# Patient Record
Sex: FEMALE | Race: OTHER | NOT HISPANIC OR LATINO | URBAN - METROPOLITAN AREA
[De-identification: names, ages, dates, MRNs, and addresses within clinical notes are randomized per-mention and may not be internally consistent; named-entity substitution may affect disease eponyms.]

---

## 2019-10-29 ENCOUNTER — EMERGENCY (EMERGENCY)
Facility: HOSPITAL | Age: 19
LOS: 1 days | Discharge: HOME | End: 2019-10-29
Attending: EMERGENCY MEDICINE | Admitting: EMERGENCY MEDICINE
Payer: MEDICAID

## 2019-10-29 VITALS
DIASTOLIC BLOOD PRESSURE: 90 MMHG | SYSTOLIC BLOOD PRESSURE: 121 MMHG | TEMPERATURE: 98 F | WEIGHT: 136.03 LBS | OXYGEN SATURATION: 100 % | RESPIRATION RATE: 18 BRPM | HEART RATE: 80 BPM | HEIGHT: 60 IN

## 2019-10-29 PROCEDURE — 99284 EMERGENCY DEPT VISIT MOD MDM: CPT

## 2019-10-29 NOTE — ED PROVIDER NOTE - CLINICAL SUMMARY MEDICAL DECISION MAKING FREE TEXT BOX
intermittent frontal pounding headache x 3 weeks with associated intermittent nausea, light sensitivity and phonophobia. Neuro exam intact. No fam hx of brain tumor or aneurysm. Symptoms c/w migraine. Discussed treatment with patient who agrees to try reglan, toradol, NS. No imaging indicated at this time - discussed extensively with patient who agrees.

## 2019-10-29 NOTE — ED PROVIDER NOTE - NSFOLLOWUPCLINICS_GEN_ALL_ED_FT
[facility]  Neurology  [address] [place] [address] [address] [place] [address]  Phone: [phone]  Fax:   Follow Up Time:

## 2019-10-29 NOTE — ED ADULT NURSE NOTE - OBJECTIVE STATEMENT
18 yo F w/ no PMHx presents to ED via walk in c/o HA x3 weeks. Pt states she has had "pounding" headache for 3 weeks, saw PMD was prescribed sumatriptan for migraines. Has been taking medication as prescribed w/o relief. Pt reports associated nausea and sensitivity to light and sound. Denies recent falls or injuries. No change in vision. Extremities strong and equal b/l. Pt denies any CP, SOB, N/V, fever, chills, urinary complaints, constipation, diarrhea, dizziness, weakness. Pt A&Ox4, lungs CTA, +central pulses. Abdomen soft, not tender, not distended. Ambulating w/ steady gait, safety and comfort maintained, no acute distress noted at this time.

## 2019-10-29 NOTE — ED PROVIDER NOTE - OBJECTIVE STATEMENT
Patient is a 20 yo F with no chronic medical problems Patient is a 18 yo F with no chronic medical problems here for 3 weeks of frontal pounding headache. She states she went to a primary doctor who told her it was migraines and prescribed sumatriptan which she has taken without relief. She reports intermittent nausea, sensitivity to light and noise. No focal weakness. No weight loss. No vomiting. No fevers. No neck pain. No chest pain or shortness of breath. Denies family history of aneurysms, brain tumor. She reports her maternal grandmother had migraines.

## 2019-10-29 NOTE — ED PROVIDER NOTE - PATIENT PORTAL LINK FT
You can access the FollowMyHealth Patient Portal offered by [facility] by registering at the following website: [email or web address]. By joining [employer]’s FollowMyHealth portal, you will also be able to view your health information using other applications (apps) compatible with our system.

## 2019-10-29 NOTE — ED PROVIDER NOTE - NSFOLLOWUPINSTRUCTIONS_ED_ALL_ED_FT
Please follow up with neurology as we discussed. Return for symptoms such as fever, vomiting, worsening headache, weakness, difficulty walking or new symptoms of concern.

## 2019-10-30 VITALS
HEART RATE: 74 BPM | OXYGEN SATURATION: 100 % | SYSTOLIC BLOOD PRESSURE: 111 MMHG | DIASTOLIC BLOOD PRESSURE: 72 MMHG | TEMPERATURE: 98 F | RESPIRATION RATE: 16 BRPM

## 2019-10-30 PROCEDURE — 96374 THER/PROPH/DIAG INJ IV PUSH: CPT

## 2019-10-30 PROCEDURE — 99284 EMERGENCY DEPT VISIT MOD MDM: CPT | Mod: 25

## 2019-10-30 PROCEDURE — 96375 TX/PRO/DX INJ NEW DRUG ADDON: CPT

## 2019-10-30 RX ORDER — METOCLOPRAMIDE HCL 10 MG
10 TABLET ORAL ONCE
Refills: 0 | Status: COMPLETED | OUTPATIENT
Start: 2019-10-30 | End: 2019-10-30

## 2019-10-30 RX ORDER — KETOROLAC TROMETHAMINE 30 MG/ML
30 SYRINGE (ML) INJECTION ONCE
Refills: 0 | Status: DISCONTINUED | OUTPATIENT
Start: 2019-10-30 | End: 2019-10-30

## 2019-10-30 RX ORDER — SODIUM CHLORIDE 9 MG/ML
1000 INJECTION INTRAMUSCULAR; INTRAVENOUS; SUBCUTANEOUS ONCE
Refills: 0 | Status: COMPLETED | OUTPATIENT
Start: 2019-10-30 | End: 2019-10-30

## 2019-10-30 RX ADMIN — SODIUM CHLORIDE 1000 MILLILITER(S): 9 INJECTION INTRAMUSCULAR; INTRAVENOUS; SUBCUTANEOUS at 00:06

## 2019-10-30 RX ADMIN — Medication 10 MILLIGRAM(S): at 00:05

## 2019-10-30 RX ADMIN — Medication 30 MILLIGRAM(S): at 00:05

## 2019-11-08 DIAGNOSIS — R51 HEADACHE: ICD-10-CM

## 2020-02-25 PROBLEM — Z78.9 OTHER SPECIFIED HEALTH STATUS: Chronic | Status: ACTIVE | Noted: 2019-10-30

## 2020-03-18 ENCOUNTER — APPOINTMENT (OUTPATIENT)
Dept: OBGYN | Facility: CLINIC | Age: 20
End: 2020-03-18

## 2020-05-14 PROBLEM — Z00.00 ENCOUNTER FOR PREVENTIVE HEALTH EXAMINATION: Status: ACTIVE | Noted: 2020-05-14

## 2020-10-20 NOTE — ED PROVIDER NOTE - CPE EDP EYES NORM
If you are a smoker, it is important for your health to stop smoking. Please be aware that second hand smoke is also harmful. normal...

## 2024-11-04 NOTE — ED PROVIDER NOTE - ENMT, MLM
Show Topical Anesthesia Variable?: Yes Include Z78.9 (Other Specified Conditions Influencing Health Status) As An Associated Diagnosis?: No Consent: The patient's consent was obtained including but not limited to risks of crusting, scabbing, blistering, scarring, darker or lighter pigmentary change, recurrence, incomplete removal and infection. Medical Necessity Information: It is in your best interest to select a reason for this procedure from the list below. All of these items fulfill various CMS LCD requirements except the new and changing color options. Number Of Freeze-Thaw Cycles: 1 freeze-thaw cycle Detail Level: Detailed Medical Necessity Clause: This procedure was medically necessary because the lesions that were treated were: Spray Paint Text: The liquid nitrogen was applied to the skin utilizing a spray paint frosting technique. Duration Of Freeze Thaw-Cycle (Seconds): 5-10 Post-Care Instructions: I reviewed with the patient in detail post-care instructions. Patient is to wear sunprotection, and avoid picking at any of the treated lesions. Pt may apply Vaseline to crusted or scabbing areas. Airway patent, Nasal mucosa clear. Mouth with normal mucosa. Throat has no vesicles, no oropharyngeal exudates and uvula is midline.

## 2024-11-28 NOTE — ED ADULT NURSE NOTE - PAIN RATING/NUMBER SCALE (0-10): ACTIVITY
7

## 2025-03-19 NOTE — ED ADULT NURSE NOTE - CHPI ED NUR SYMPTOMS NEG
What Type Of Note Output Would You Prefer (Optional)?: Standard Output
How Severe Are Your Spot(S)?: moderate
Have Your Spot(S) Been Treated In The Past?: has not been treated
Hpi Title: Evaluation of Skin Lesions
no change in level of consciousness/no confusion/no dizziness/no weakness/no blurred vision/no fever/no loss of consciousness/no numbness/no vomiting

## 2025-04-03 ENCOUNTER — EMERGENCY (EMERGENCY)
Facility: HOSPITAL | Age: 25
LOS: 1 days | Discharge: HOME | End: 2025-04-03
Attending: STUDENT IN AN ORGANIZED HEALTH CARE EDUCATION/TRAINING PROGRAM
Payer: MEDICAID

## 2025-04-03 VITALS
TEMPERATURE: 99 F | SYSTOLIC BLOOD PRESSURE: 122 MMHG | HEART RATE: 77 BPM | WEIGHT: 145.06 LBS | RESPIRATION RATE: 18 BRPM | HEIGHT: 61 IN | OXYGEN SATURATION: 97 % | DIASTOLIC BLOOD PRESSURE: 79 MMHG

## 2025-04-03 PROCEDURE — 99285 EMERGENCY DEPT VISIT HI MDM: CPT

## 2025-04-03 NOTE — ED ADULT TRIAGE NOTE - CHIEF COMPLAINT QUOTE
5 weeks pregnant. Vaginal bleeding 30 minutes ago. Bright red clots. Lower back pain. Confirmed pregnancy by OBGYN

## 2025-04-04 VITALS
SYSTOLIC BLOOD PRESSURE: 124 MMHG | RESPIRATION RATE: 18 BRPM | DIASTOLIC BLOOD PRESSURE: 60 MMHG | HEART RATE: 86 BPM | TEMPERATURE: 99 F | OXYGEN SATURATION: 99 %

## 2025-04-04 LAB
ALBUMIN SERPL ELPH-MCNC: 4.3 G/DL — SIGNIFICANT CHANGE UP (ref 3.3–5)
ALP SERPL-CCNC: 48 U/L — SIGNIFICANT CHANGE UP (ref 40–120)
ALT FLD-CCNC: 12 U/L — SIGNIFICANT CHANGE UP (ref 10–45)
ANION GAP SERPL CALC-SCNC: 12 MMOL/L — SIGNIFICANT CHANGE UP (ref 5–17)
APTT BLD: 30.1 SEC — SIGNIFICANT CHANGE UP (ref 24.5–35.6)
AST SERPL-CCNC: 14 U/L — SIGNIFICANT CHANGE UP (ref 10–40)
BASOPHILS # BLD AUTO: 0.04 K/UL — SIGNIFICANT CHANGE UP (ref 0–0.2)
BASOPHILS NFR BLD AUTO: 0.5 % — SIGNIFICANT CHANGE UP (ref 0–2)
BILIRUB SERPL-MCNC: 0.2 MG/DL — SIGNIFICANT CHANGE UP (ref 0.2–1.2)
BLD GP AB SCN SERPL QL: NEGATIVE — SIGNIFICANT CHANGE UP
BUN SERPL-MCNC: 13 MG/DL — SIGNIFICANT CHANGE UP (ref 7–23)
CALCIUM SERPL-MCNC: 9 MG/DL — SIGNIFICANT CHANGE UP (ref 8.4–10.5)
CHLORIDE SERPL-SCNC: 104 MMOL/L — SIGNIFICANT CHANGE UP (ref 96–108)
CO2 SERPL-SCNC: 20 MMOL/L — LOW (ref 22–31)
CREAT SERPL-MCNC: 0.56 MG/DL — SIGNIFICANT CHANGE UP (ref 0.5–1.3)
EGFR: 128 ML/MIN/1.73M2 — SIGNIFICANT CHANGE UP
EGFR: 128 ML/MIN/1.73M2 — SIGNIFICANT CHANGE UP
EOSINOPHIL # BLD AUTO: 0.07 K/UL — SIGNIFICANT CHANGE UP (ref 0–0.5)
EOSINOPHIL NFR BLD AUTO: 0.8 % — SIGNIFICANT CHANGE UP (ref 0–6)
GLUCOSE SERPL-MCNC: 95 MG/DL — SIGNIFICANT CHANGE UP (ref 70–99)
HCG SERPL-ACNC: 2077 MIU/ML — HIGH
HCT VFR BLD CALC: 35.6 % — SIGNIFICANT CHANGE UP (ref 34.5–45)
HGB BLD-MCNC: 12.1 G/DL — SIGNIFICANT CHANGE UP (ref 11.5–15.5)
IMM GRANULOCYTES NFR BLD AUTO: 0.2 % — SIGNIFICANT CHANGE UP (ref 0–0.9)
INR BLD: 0.91 RATIO — SIGNIFICANT CHANGE UP (ref 0.85–1.16)
LYMPHOCYTES # BLD AUTO: 3.3 K/UL — SIGNIFICANT CHANGE UP (ref 1–3.3)
LYMPHOCYTES # BLD AUTO: 38.9 % — SIGNIFICANT CHANGE UP (ref 13–44)
MCHC RBC-ENTMCNC: 31.3 PG — SIGNIFICANT CHANGE UP (ref 27–34)
MCHC RBC-ENTMCNC: 34 G/DL — SIGNIFICANT CHANGE UP (ref 32–36)
MCV RBC AUTO: 92 FL — SIGNIFICANT CHANGE UP (ref 80–100)
MONOCYTES # BLD AUTO: 0.6 K/UL — SIGNIFICANT CHANGE UP (ref 0–0.9)
MONOCYTES NFR BLD AUTO: 7.1 % — SIGNIFICANT CHANGE UP (ref 2–14)
NEUTROPHILS # BLD AUTO: 4.45 K/UL — SIGNIFICANT CHANGE UP (ref 1.8–7.4)
NEUTROPHILS NFR BLD AUTO: 52.5 % — SIGNIFICANT CHANGE UP (ref 43–77)
NRBC BLD AUTO-RTO: 0 /100 WBCS — SIGNIFICANT CHANGE UP (ref 0–0)
PLATELET # BLD AUTO: 231 K/UL — SIGNIFICANT CHANGE UP (ref 150–400)
POTASSIUM SERPL-MCNC: 4.1 MMOL/L — SIGNIFICANT CHANGE UP (ref 3.5–5.3)
POTASSIUM SERPL-SCNC: 4.1 MMOL/L — SIGNIFICANT CHANGE UP (ref 3.5–5.3)
PROT SERPL-MCNC: 6.9 G/DL — SIGNIFICANT CHANGE UP (ref 6–8.3)
PROTHROM AB SERPL-ACNC: 10.5 SEC — SIGNIFICANT CHANGE UP (ref 9.9–13.4)
RBC # BLD: 3.87 M/UL — SIGNIFICANT CHANGE UP (ref 3.8–5.2)
RBC # FLD: 11.3 % — SIGNIFICANT CHANGE UP (ref 10.3–14.5)
RH IG SCN BLD-IMP: POSITIVE — SIGNIFICANT CHANGE UP
SODIUM SERPL-SCNC: 136 MMOL/L — SIGNIFICANT CHANGE UP (ref 135–145)
WBC # BLD: 8.48 K/UL — SIGNIFICANT CHANGE UP (ref 3.8–10.5)
WBC # FLD AUTO: 8.48 K/UL — SIGNIFICANT CHANGE UP (ref 3.8–10.5)

## 2025-04-04 PROCEDURE — 85025 COMPLETE CBC W/AUTO DIFF WBC: CPT

## 2025-04-04 PROCEDURE — 36000 PLACE NEEDLE IN VEIN: CPT

## 2025-04-04 PROCEDURE — 84702 CHORIONIC GONADOTROPIN TEST: CPT

## 2025-04-04 PROCEDURE — 99285 EMERGENCY DEPT VISIT HI MDM: CPT | Mod: 25

## 2025-04-04 PROCEDURE — 85610 PROTHROMBIN TIME: CPT

## 2025-04-04 PROCEDURE — 85730 THROMBOPLASTIN TIME PARTIAL: CPT

## 2025-04-04 PROCEDURE — 86901 BLOOD TYPING SEROLOGIC RH(D): CPT

## 2025-04-04 PROCEDURE — 86900 BLOOD TYPING SEROLOGIC ABO: CPT

## 2025-04-04 PROCEDURE — 93975 VASCULAR STUDY: CPT | Mod: 26

## 2025-04-04 PROCEDURE — 80053 COMPREHEN METABOLIC PANEL: CPT

## 2025-04-04 PROCEDURE — 86850 RBC ANTIBODY SCREEN: CPT

## 2025-04-04 PROCEDURE — 76817 TRANSVAGINAL US OBSTETRIC: CPT | Mod: 26

## 2025-04-04 PROCEDURE — 93975 VASCULAR STUDY: CPT

## 2025-04-04 PROCEDURE — 76817 TRANSVAGINAL US OBSTETRIC: CPT

## 2025-04-04 RX ADMIN — Medication 1000 MILLILITER(S): at 06:10

## 2025-04-04 NOTE — ED PROVIDER NOTE - PATIENT PORTAL LINK FT
You can access the FollowMyHealth Patient Portal offered by [facility] by registering at the following website: [email or web address]. By joining [employer]’s FollowMyHealth portal, you will also be able to view your health information using other applications (apps) compatible with our system.

## 2025-04-04 NOTE — ED PROVIDER NOTE - OBJECTIVE STATEMENT
25-year-old female G1, P0, 5 weeks pregnant, LMP 3/3/2025 presents ED complaining of 1 day of vaginal bleeding.  Symptom onset with low back pain around 630, progressively worsening with passage of bright red blood clots in the vagina around 9 PM.  Prior to arrival in the waiting room had another episodes of bright red clots.  Saw OB on Monday, stated was late with.,  Had ultrasound done which did not show an intrauterine pregnancy, had pregnancy test done, was called back yesterday and was told that it was positive.  Otherwise denies fevers chills nausea vomiting diarrhea chest pain shortness of breath abdominal pain dysuria hematuria. Denies syncopal symptoms.

## 2025-04-04 NOTE — ED PROVIDER NOTE - CLINICAL SUMMARY MEDICAL DECISION MAKING FREE TEXT BOX
25-year-old female G1, P0, 5 weeks pregnant, LMP 3/3/2025 presents ED complaining of 1 day of vaginal bleeding.  Symptom onset with low back pain around 630, progressively worsening with passage of bright red blood clots in the vagina around 9 PM.  Prior to arrival in the waiting room had another episodes of bright red clots.  Saw OB on Monday, stated was late with.,  Had ultrasound done which did not show an intrauterine pregnancy, had pregnancy test done, was called back yesterday and was told that it was positive.  Otherwise denies fevers chills nausea vomiting diarrhea chest pain shortness of breath abdominal pain dysuria hematuria. Denies syncopal symptoms.     VS. Clinically stable. PE, well appearing, no acute distress, AAOx3. NCAT, EOMI, normal conjunctiva, mucous membranes moist, LCTAB no w/r/c, no MRG, RRR, abd mild suprapubic discomfort, no rebound tenderness or guarding, no CVA ttp, no focal neuro deficits, neurovascularly intact, no bruising, rashes, or erythema. Suspicion for spontaneous miscarriage vs incomplete  vs subchorrionic hemorhage vs normal physiologic bleeding of pregnancy. Will check labs, coags, type, US, Nidhi

## 2025-04-04 NOTE — ED PROVIDER NOTE - NSFOLLOWUPINSTRUCTIONS_ED_ALL_ED_FT
We evaluated you for vaginal bleeding today. We checked your hemoglobin and you do not need a blood transfusion at this time. Your situation can change quickly. If you develop bleeding that you are soaking through more than 2 pads per hour for 2 hours, if you develop lightheadedness/dizziness/feeling like you will pass out, chest pain, shortness of breath please return to the emergency room. We recommend that you make an appointment with your obstetrician-gynecologist for repeat evaluation and to ensure the symptoms are improving.     Please followup with your OB in 2 days for a repeat HCG and a repeat US. You can also return to the ED for repeat HCG and US. Please return to the ED for worsening symptoms, this includes worsening abdominal pain, vaginal bleeding, feeling feint or lightheaded, feeling like you are about to pass out.

## 2025-04-04 NOTE — ED PROVIDER NOTE - IV ALTEPLASE EXCL ABS HIDDEN
The following are the instructions for home care, to inform you about your treatment and to help you with comfort and to control the pain and symptoms.    Please proceed to Emergency Room at any time if your medical condition would worsen significantly.    Resting of the affected area is the main principle of treatment.   Continue using left arm sling for as long as physical therapy advises.    Maximum dose of Tylenol (acetaminophen) is 500 mg OTC every 6 hours taken orally for pain control, as needed, for the next few days.    Maximum dose of ibuprofen is 600 mg OTC every 6 hours taken orally as needed for pain, for the next few days, always taken with food and with monitoring for upper abdomen discomfort because ibuprofen may cause upset stomach or stomach ulcers.    Please consider application of over-the-counter OTC Lidocaine-containing antipain ointments like Biofreeze OTC or Icy Hot OTC every few hours on the skin at the pain area. Wash your hands after application of lidocaine.  Cooling with ice packs (while awake) may help.  Warm compresses (while awake) later on may be considered.    Your blood pressure has been elevated.  Please measure blood pressure daily and report your readings to primary care provider if needed.    Please contact primary care provider to reevaluate the recovery process.  I have placed a referral to Physical Therapy.  I have placed a referral to orthopedics hand specialist.    Please call back with any additional questions to make sure that your needs are attended and all of your questions have been answered.    If any tests have been performed and the test results are not available at this time, we will notify you about the test results as soon as they become available.  Please share this information with any of your family members if you prefer.       show

## 2025-04-04 NOTE — ED ADULT NURSE NOTE - NS ED NOTE ABUSE SUSPICION NEGLECT YN
Problem: Pain  Goal: #Acceptable pain level achieved/maintained at rest using NRS/Faces  Description: This goal is used for patients who can self-report.  Acceptable means the level is at or below the identified comfort/function goal.  Outcome: Outcome Not Met, Plan Adjusted   Complains of abdominal pain. PRN fentanyl and norco ordered.     Problem: Depressive Symptoms  Goal: #Depressive s/s (self-reported/observed) are recognized and monitored  Outcome: Outcome Met, Continue evaluating goal progress toward completion   Dc'd from  and admitted to medical for pancreatitis. Chapter lifted per Dr. Villatoro. Belongings returned to patient from security. Patient states slight improvement in mental state. No SI. No need for sitter at this time. Will continue to monitor  
No

## 2025-04-04 NOTE — CONSULT NOTE ADULT - TIME BILLING
/Attendin y/o  at 4w3d by LMP of 3/4/25 presenting with vaginal bleeding w/ hx  positive pregnancy test in her Gyn office.  Pt discussed with me; chart reviewed; I agree with the above Resident's assessment and plan.    Pt presented to the ED w/ complaints of vaginal which started overnight on 4/3 with use of 2 pads and only had to change once since being in the ED.  Pt was seen in her Gyn office on 3/31 for routine exam and noted to have and incidental +pregnancy test.  Pt admits to some cramping and passing come clots, but denies any other complaints.    Vss, afebrile  PE per Resident was benign with some old blood in vault; cervix closed; not uterine or adnexal tenderness.    Labs: H/H: 12.1/35.6; Plt: 231; HC; B+    Sono: IMPRESSION: Neither an intrauterine nor extrauterine gestation is identified. This represents a pregnancy of indeterminate location. Differential diagnosis includes early normal IUP, pregnancy failure or ectopic pregnancy. Serial hCG and ultrasound are recommended to determine the significance of these findings.    A/P  -26 y/o  at 4w3d by LMP of 3/4/25 presenting with vaginal bleeding w/ hx  positive pregnancy test in her Gyn office w/ pregnancy of unknown location.  -Pt is currently stable w/ possible early IUP w/ threatened Ab vs complete ab at this time; low suspicion for ectopic pregnancy, but cannot be ruled out and needs to return for follow-up BHCG in 48hr w/ sono for further evaluation.  -Ectopic precautions given and pt encouraged to return of any severe pain or heavy bleeding.  -No acute Gyn intervention at this time with further management as per ED.    Thank you for the consult; please call if any further questions.    Dr. Caldwell

## 2025-04-04 NOTE — ED PROVIDER NOTE - PROGRESS NOTE DETAILS
Mati Ibrahim, PGY2    OB consulted for vag bleed w preg of unknown location.   pt Ob. Dr. Beadr Mati Ibrahim, PGY2    offered pt pelvic, deffering at this time for exam with OB. Still w some vaginal spotting

## 2025-04-04 NOTE — ED PROVIDER NOTE - ATTENDING CONTRIBUTION TO CARE
25-year-old female , 5 weeks pregnant, LMP 3/3/2025 presents ED complaining of 1 day of vaginal bleeding, passing clots, no measurable pads, no fever, chills, trauma. No confirmed IUP but + preg test.     Hemodynamically stable, NAD, aaox4 to person/place/time/event. no inc wob, rrr, benign abd, no peritoneal signs, no cva ttp, no e/o clinical anemia, no e/o jaundice.     Acute uterine bleeding, atraumatic in setting of + home pregnancy test, no confirmed iup, eval spont ab, ectopic, preg unknown location. Check labs, T&S, bHCG, TVUS. Summary of presentation, physical exam findings, plan, expected turn around time for diagnostics discussed with patient. Agrees.

## 2025-04-04 NOTE — CHART NOTE - NSCHARTNOTEFT_GEN_A_CORE
Patient called to remind to come to ED for repeat bHCG. Patient did not . ASHLEY left    SHWETA Ordoñez PGY4

## 2025-04-04 NOTE — ED ADULT NURSE NOTE - CINV DISCH TEACH PARTICIP
OUTPATIENT PROGRESS NOTE  Date of Service:  8/9/2022  Address: [facility]  [address] [place] [address],First Floor [address]  Dept: [phone]  Loc: [phone]    Subjective:      Patient ID:  [id number]  Petey Breaux is a 30 y.o. female     Gynecologic Exam  The patient's pertinent negatives include no genital itching, genital lesions, genital odor, genital rash, missed menses, pelvic pain, vaginal bleeding or vaginal discharge. The patient is experiencing no pain. She is not pregnant. Pertinent negatives include no abdominal pain, anorexia, back pain, chills, constipation, diarrhea, dysuria, fever, flank pain, frequency, headaches, joint swelling, nausea, painful intercourse, rash, sore throat or urgency. Nothing aggravates the symptoms. She is sexually active. No, her partner does not have an STD. Well exam  She is doing fine  Here for pap   No concerns  No history of abnormal pap   No discharge     Bump on left wrist   getting larger and painful    Review of Systems   Constitutional:  Negative for chills and fever. HENT:  Negative for sore throat. Gastrointestinal:  Negative for abdominal pain, anorexia, constipation, diarrhea and nausea. Genitourinary:  Negative for dysuria, flank pain, frequency, missed menses, pelvic pain, urgency and vaginal discharge. Musculoskeletal:  Negative for back pain. Skin:  Negative for rash. Neurological:  Negative for headaches. Objective:   YOB: 1997    Date of Visit:  8/9/2022       Allergies   Allergen Reactions    Prochlorperazine Anxiety       Outpatient Medications Marked as Taking for the 8/9/22 encounter (Office Visit) with Timothy Morales DO   Medication Sig Dispense Refill    LORazepam (ATIVAN) 0.5 MG tablet Take 1 tablet by mouth 2 times daily as needed for Anxiety for up to 30 days.  60 tablet 0    XULANE 150-35 MCG/24HR APPLY 1 PATCH ONCE A WEEK 3 patch 5    ibuprofen (ADVIL;MOTRIN) 800 MG tablet Take 1 tablet by mouth every 8 hours as needed for Pain 60 tablet 3       Vitals:    08/09/22 0746   BP: 126/70   Weight: 162 lb 12.8 oz (73.8 kg)   Height: 5' 7\" (1.702 m)     Body mass index is 25.5 kg/m². Wt Readings from Last 3 Encounters:   08/09/22 162 lb 12.8 oz (73.8 kg)   01/19/22 148 lb (67.1 kg)   10/14/21 141 lb (64 kg)     BP Readings from Last 3 Encounters:   08/09/22 126/70   01/19/22 122/80   10/14/21 134/80       Physical Exam  Vitals and nursing note reviewed. Constitutional:       Appearance: She is well-developed. HENT:      Head: Normocephalic. Right Ear: External ear normal.      Left Ear: External ear normal.   Neck:      Thyroid: No thyromegaly. Cardiovascular:      Rate and Rhythm: Normal rate and regular rhythm. Heart sounds: Normal heart sounds. Pulmonary:      Effort: Pulmonary effort is normal.      Breath sounds: Normal breath sounds. Abdominal:      General: Bowel sounds are normal.      Palpations: Abdomen is soft. There is no mass. Genitourinary:     Vagina: Vaginal discharge present. Comments: bilat breast with no masses or nipple discharge   Musculoskeletal:         General: Normal range of motion. Lymphadenopathy:      Cervical: No cervical adenopathy. Skin:     General: Skin is warm and dry. Findings: No rash. Neurological:      Mental Status: She is alert and oriented to person, place, and time. Psychiatric:         Behavior: Behavior normal.         Thought Content: Thought content normal.         Judgment: Judgment normal.          Assessment/Plan       Christopher Jean was seen today for gynecologic exam and medication refill. Diagnoses and all orders for this visit:    Well female exam with routine gynecological exam    NEENA (generalized anxiety disorder)  -     LORazepam (ATIVAN) 0.5 MG tablet; Take 1 tablet by mouth 2 times daily as needed for Anxiety for up to 30 days.     Ganglion cyst of wrist, left  - Valerie Jimenez MD, Orthopedic Surgery, [facility]      No follow-ups on file.                     Clemencia Zepeda DO Patient

## 2025-04-04 NOTE — CONSULT NOTE ADULT - SUBJECTIVE AND OBJECTIVE BOX
GABBY BORJA  25y  Female [id number]    HPI:  24 y/o  at 4w3d by LMP of 3/4/25 presenting with vaginal bleeding in the setting of positive pregnancy test. Patient was seen by OB on Monday for annual checkup where she incidentally found to be pregnant. Her bHCG level at that time was 1040. Patient then developed vaginal bleeding that started last night which prompted her to present to ED. She states she went through 2 thin pads at home over a few hours. Since then, patient has changed her pad once in the ED. She is endorsing mild abdominal cramping, but denies any sharp abdominal pain. She states she passed some clots at home but is unsure if she passed any tissue. She denies fevers/chills. She denies nausea/vomiting. She denies lightheadedness or dizziness. This is an unplanned but desired pregnancy.     Name of GYN Physician: Dr. Judith Monterroso  OBHx:  primagravida  GynHx: Denies fibroids, cysts, endometriosis, STI's, Abnormal pap smears   PMH: denies  PSH: denies  Meds: denies  Allx: NKDA  Social History:  Denies smoking use, drug use, alcohol use.       Vital Signs Last 24 Hrs  T(C): 36.9 (2025 05:07), Max: 37.1 (2025 22:13)  T(F): 98.5 (2025 05:07), Max: 98.7 (2025 22:13)  HR: 72 (2025 05:07) (70 - 77)  BP: 97/58 (2025 05:07) (97/58 - 130/81)  BP(mean): 71 (2025 05:07) (71 - 83)  RR: 18 (2025 05:07) (17 - 18)  SpO2: 99% (2025 05:07) (97% - 100%)    Parameters below as of 2025 05:07  Patient On (Oxygen Delivery Method): room air      Physical Exam:   Chaperoned by Jia GROVES  General: sitting comfortably in bed, NAD   HEENT: neck supple, full ROM  CV: well perfused  Lungs: nonlabored respirations  Back: No CVA tenderness  Abd: Soft, non-tender, non-distended.    :  Pad changed ~2 hours ago, 20% saturated. External labia wnl.  Bimanual exam with no cervical motion tenderness, uterus wnl, adnexa non palpable b/l.  Cervix closed.  Speculum Exam: 5cc dark red clot in vault, with no active bleeding from os.        LABS:                        12.1   8.48  )-----------( 231      ( 2025 00:22 )             35.6         136  |  104  |  13  ----------------------------<  95  4.1   |  20[L]  |  0.56    Ca    9.0      2025 00:22    TPro  6.9  /  Alb  4.3  /  TBili  0.2  /  DBili  x   /  AST  14  /  ALT  12  /  AlkPhos  48      I&O's Detail    PT/INR - ( 2025 00:22 )   PT: 10.5 sec;   INR: 0.91 ratio         PTT - ( 2025 00:22 )  PTT:30.1 sec  Urinalysis Basic - ( 2025 00:22 )    Color: x / Appearance: x / SG: x / pH: x  Gluc: 95 mg/dL / Ketone: x  / Bili: x / Urobili: x   Blood: x / Protein: x / Nitrite: x   Leuk Esterase: x / RBC: x / WBC x   Sq Epi: x / Non Sq Epi: x / Bacteria: x    [facility] (4/3):       RADIOLOGY & ADDITIONAL STUDIES:      ACC: [id number] EXAM:  US DPLX PELVIC   ORDERED BY:  MICHELE OSWALD     ACC: [id number] EXAM:  US OB TRANSVAGINAL   ORDERED BY:  MICHELE OSWALD     PROCEDURE DATE:  2025          INTERPRETATION:  CLINICAL INFORMATION: Pregnant with vaginal bleeding.   Beta-hCG     LMP: 2025    Estimated Gestational Age by LMP: 4 weeks 3 days    COMPARISON: None available.    TECHNIQUE: Endovaginal and transabdominal pelvic sonogram. <empty>    FINDINGS:  Uterus: 7.1 x 3.6 x 4.7 cm. Thickened endometrium to 1.3 cm. No IUP.    Gestational Sac Size (mean): N/a  Gestational Sac Shape : N/a  Crown Rump Length: N/a  Estimated Gestational Age: N/a  Yolk Sac: N/a  Fetal Heart Rate: N/a    Right ovary: 3.6 x 3.0 x 3.3 cm. Thick-walled cyst measuring 2.4 x 2.4 x   2.5 cm, likely corpus luteum. Normal arterial and venous waveforms. No   adnexal soft tissue mass. There are 2 simple appearing cysts within the   adnexa, small one measuring 0.5 x 0.7 x 0.6 cm and the slightly larger   and measuring 1.5 x 0.8 x 1.2 cm.  Left ovary: 2.3 x 1.5 x 1.2 cm. Within normal limits. Normal arterial and   venous waveforms.    Fluid: Trace    IMPRESSION:  Neither an intrauterine nor extrauterine gestation is identified. This   represents a pregnancy of indeterminate location. Differential diagnosis   includes early normal IUP, pregnancy failure or ectopic pregnancy. Serial   hCG and ultrasound are recommended to determine the significance of these   findings.        --- End of Report ---            KENYON ROBBINS MD; Attending Radiologist  This document has been electronically signed. 2025  5:03AM

## 2025-04-04 NOTE — ED PROVIDER NOTE - PHYSICAL EXAMINATION
Gen: AAOx3, non-toxic  Head: NCAT  HEENT: EOMI, oral mucosa moist, normal conjunctiva  Lung: CTAB, no respiratory distress, no wheezes/rhonchi/rales B/L,   CV: RRR, no murmurs, rubs or gallops  Abd: soft, mild suprapubic discomfort, no guarding, no CVA tenderness  MSK: no visible deformities  Neuro: No focal sensory or motor deficits  Skin: Warm, well perfused, no rash  Psych: normal affect.

## 2025-04-04 NOTE — ED ADULT NURSE NOTE - OBJECTIVE STATEMENT
24 y/o  F with no significant PMHx presents to the ED at approximately 5 weeks gestation with complaints of vaginal bleeding today. Patient states is followed by OB-GYN however has not had confirmed IUP, scheduled for ultrasound tomorrow. Patient reports today developed vaginal bleeding with clots. Patient has been using 2 pads per hour and developed lower back pain. LMP: 3/4. Denies fever, chills. AOx4 and speaking coherently. Breathing is unlabored, spontaneous, and symmetrical. Abdomen is soft, nondistended, and nontender. No bladder distention. No peripheral edema noted. <2s capillary refill. Ambulatory with full ROM of all extremities.

## 2025-04-04 NOTE — ED PROVIDER NOTE - NSFOLLOWUPCLINICS_GEN_ALL_ED_FT
[facility] Gynecology and Obstetrics  Gynceology/OB  [address] [place] [address]  Phone: [phone]  Fax:

## 2025-04-04 NOTE — CONSULT NOTE ADULT - ASSESSMENT
[facility] Emergency Department    [address] [address]    Phone:  [phone]    Fax:  [phone]                                       Laura Lea   MRN: [id number]    Department:  [facility] Emergency Department   Date of Visit:  12/14/2017           Patient Information     Date Of Birth          1976        Your diagnoses for this visit were:     Flatulence, eructation, and gas pain     Constipation, unspecified constipation type        You were seen by Facundo Thakkar MD.      Follow-up Information     Follow up with No Ref-Primary, Physician.    Why:  As needed        Discharge Instructions         Constipation (Adult)  Constipation means that you have bowel movements that are less frequent than usual. Stools often become very hard and difficult to pass.  Constipation is very common. At some point in life it affects almost everyone. Since everyone's bowel habits are different, what is constipation to one person may not be to another. Your healthcare provider may do tests to diagnose constipation. It depends on what he or she finds when evaluating you.    Symptoms of constipation include:    Abdominal pain    Bloating    Vomiting    Painful bowel movements    Itching, swelling, bleeding, or pain around the anus  Causes  Constipation can have many causes. These include:    Diet low in fiber    Too much dairy    Not drinking enough liquids    Lack of exercise or physical activity. This is especially true for older adults.    Changes in lifestyle or daily routine, including pregnancy, aging, work, and travel    Frequent use or misuse of laxatives    Ignoring the urge to have a bowel movement or delaying it until later    Medicines, such as certain prescription pain medicines, iron supplements, antacids, certain antidepressants, and calcium supplements    Diseases like irritable bowel syndrome, bowel obstructions, stroke, diabetes, thyroid disease, Parkinson disease,  A/P: 26 y/o  at 4w3d by LMP of 3/4/25 presenting with vaginal bleeding in the setting of positive pregnancy test. VSS, physical exam benign with some dark red blood in vault but no active bleeding. H/H of 12.1/35.6 with bHCG trend of 1040 (3/31)->2077(4/3). TVUS with no IUP visualized and no extrauterine gestation visualized with trace free fluid. Differential includes SAB in progress vs threatened AB (given increasing bHCG with bleeding) vs early IUP with low clinical suspicion for ectopic pregnancy at this time.    - Patient to present to ED for repeat bHCG level in 48 hours  - Patient to return to ED sooner if she has heavy vaginal bleeding where she is saturating a pad in <1 hour for 2 hours, if she has severe abdominal pain, any fever of 100.4F or greater, or severe nausea/vomiting.   - Patient's blood type is B+, no Rhogam indicated at this time  - patient to be added to GYN team Beta list   - Remainder of care per ED team    Anai Staley PGY2   d/w Dr. Son hemorrhoids, and colon cancer  Complications  Potential complications of constipation can include:    Hemorrhoids    Rectal bleeding from hemorrhoids or anal fissures (skin tears)    Hernias    Dependency on laxatives    Chronic constipation    Fecal impaction    Bowel obstruction or perforation  Home care  All treatment should be done after talking with your healthcare provider. This is especially true if you have another medical problems, are taking prescription medicines, or are an older adult. Treatment most often involves lifestyle changes. You may also need medicines. Your healthcare provider will tell you which will work best for you. Follow the advice below to help avoid this problem in the future.  Lifestyle changes  These lifestyle changes can help prevent constipation:    Diet. Eat a high-fiber diet, with fresh fruit and vegetables, and reduce dairy intake, meats, and processed foods    Fluids. It's important to get enough fluids each day. Drink plenty of water when you eat more fiber. If you are on diet that limits the amount of fluid you can have, talk about this with your healthcare provider.    Regular exercise. Check with your healthcare provider first.  Medications  Take any medicines as directed. Some laxatives are safe to use only every now and then. Others can be taken on a regular basis. Talk with your doctor or pharmacist if you have questions.  Prescription pain medicines can cause constipation. If you are taking this kind of medicine, ask your healthcare provider if you should also take a stool softener.  Medicines you may take to treat constipation include:    Fiber supplements    Stool softeners    Laxatives    Enemas    Rectal suppositories  Follow-up care  Follow up with your healthcare provider if symptoms don't get better in the next few days. You may need to have more tests or see a specialist.  Call 911  Call 911 if any of these occur:    Trouble breathing    Stiff, rigid abdomen that  is severely painful to touch    Confusion    Fainting or loss of consciousness    Rapid heart rate    Chest pain  When to seek medical advice  Call your healthcare provider right away if any of these occur:    Fever over 100.4 F (38 C)    Failure to resume normal bowel movements    Pain in your abdomen or back gets worse    Nausea or vomiting    Swelling in your abdomen    Blood in the stool    Black, tarry stool    Involuntary weight loss    Weakness  Date Last Reviewed: 12/30/2015 2000-2017 [employer]. [address] [place] [address], [place] [id number]. All rights reserved. This information is not intended as a substitute for professional medical care. Always follow your healthcare professional's instructions.          24 Hour Appointment Hotline       To make an appointment at any [facility], call [phone] ([phone]). If you don't have a family doctor or clinic, we will help you find one. [facility] clinics are conveniently located to serve the needs of you and your family.             Review of your medicines      Our records show that you are taking the medicines listed below. If these are incorrect, please call your family doctor or clinic.        Dose / Directions Last dose taken    B-12 2500 MCG Subl   Dose:  2500 mcg        Place 2,500 mcg under the tongue daily   Refills:  0        BACLOFEN PO   Dose:  10 mg        Take 10 mg by mouth 3 times daily   Refills:  0        DOCUSATE SODIUM PO   Dose:  100 mg        Take 100 mg by mouth 2 times daily   Refills:  0        DULOXETINE HCL PO   Dose:  60 mg        Take 60 mg by mouth daily   Refills:  0        GABAPENTIN PO   Dose:  300 mg        Take 300 mg by mouth 3 times daily   Refills:  0        IBUPROFEN PO   Dose:  400 mg        Take 400 mg by mouth every 6 hours as needed for moderate pain   Refills:  0        magnesium hydroxide 400 MG/5ML suspension   Commonly known as:  MILK OF MAGNESIA   Dose:  30 mL        Take 30 mLs by mouth  daily as needed for constipation or heartburn   Refills:  0        MELATONIN PO   Dose:  5 mg        Take 5 mg by mouth At Bedtime   Refills:  0        multivitamin, therapeutic Tabs tablet   Dose:  1 tablet        Take 1 tablet by mouth daily   Refills:  0        RISPERDAL PO   Dose:  0.25 mg        Take 0.25 mg by mouth 2 times daily   Refills:  0        SIMETHICONE-80 PO   Dose:  160 mg        Take 160 mg by mouth 4 times daily as needed for intestinal gas   Refills:  0        TYLENOL PO   Dose:  500 mg        Take 500 mg by mouth every 6 hours as needed for mild pain or fever   Refills:  0                Procedures and tests performed during your visit     CBC with platelets differential    CT Abdomen Pelvis w Contrast    Comprehensive metabolic panel    Drug abuse screen 77 urine ([place],[facility])    Give 20 ounces of water 15 minutes before CT of abdomen    Lactic acid whole blood    Lipase    Magnesium    Peripheral IV catheter    Phosphorus    UA reflex to Microscopic      Orders Needing Specimen Collection     None      Pending Results     Date and Time Order Name Status Description    12/14/2017 1839 CT Abdomen Pelvis w Contrast Preliminary             Pending Culture Results     No orders found from 12/12/2017 to 12/15/2017.            Pending Results Instructions     If you had any lab results that were not finalized at the time of your Discharge, you can call the ED Lab Result RN at [phone]. You will be contacted by this team for any positive Lab results or changes in treatment. The nurses are available 7 days a week from 10A to 6:30P.  You can leave a message 24 hours per day and they will return your call.        Test Results From Your Hospital Stay        12/14/2017  6:01 PM      Component Results     Component Value Ref Range & Units Status    WBC 8.0 4.0 - 11.0 10e9/L Final    RBC Count 3.63 (L) 3.8 - 5.2 10e12/L Final    Hemoglobin 10.6 (L) 11.7 - 15.7 g/dL Final    Hematocrit 33.1 (L) 35.0 - 47.0 %  Final    MCV 91 78 - 100 fl Final    MCH 29.2 26.5 - 33.0 pg Final    MCHC 32.0 31.5 - 36.5 g/dL Final    RDW 14.8 10.0 - 15.0 % Final    Platelet Count 378 150 - 450 10e9/L Final    Diff Method Automated Method  Final    % Neutrophils 51.6 % Final    % Lymphocytes 35.0 % Final    % Monocytes 9.4 % Final    % Eosinophils 3.3 % Final    % Basophils 0.4 % Final    % Immature Granulocytes 0.3 % Final    Absolute Neutrophil 4.1 1.6 - 8.3 10e9/L Final    Absolute Lymphocytes 2.8 0.8 - 5.3 10e9/L Final    Absolute Monocytes 0.8 0.0 - 1.3 10e9/L Final    Absolute Eosinophils 0.3 0.0 - 0.7 10e9/L Final    Absolute Basophils 0.0 0.0 - 0.2 10e9/L Final    Abs Immature Granulocytes 0.0 0 - 0.4 10e9/L Final         12/14/2017  6:32 PM      Component Results     Component Value Ref Range & Units Status    Sodium 141 133 - 144 mmol/L Final    Potassium 4.1 3.4 - 5.3 mmol/L Final    Chloride 108 94 - 109 mmol/L Final    Carbon Dioxide 24 20 - 32 mmol/L Final    Anion Gap 9 3 - 14 mmol/L Final    Glucose 112 (H) 70 - 99 mg/dL Final    Urea Nitrogen 25 7 - 30 mg/dL Final    Creatinine 0.58 0.52 - 1.04 mg/dL Final    GFR Estimate >90 >60 mL/min/1.7m2 Final    Non  GFR Calc    GFR Estimate If Black >90 >60 mL/min/1.7m2 Final    African American GFR Calc    Calcium 8.4 (L) 8.5 - 10.1 mg/dL Final    Bilirubin Total 0.1 (L) 0.2 - 1.3 mg/dL Final    Albumin 3.4 3.4 - 5.0 g/dL Final    Protein Total 6.8 6.8 - 8.8 g/dL Final    Alkaline Phosphatase 113 40 - 150 U/L Final    ALT 29 0 - 50 U/L Final    AST 28 0 - 45 U/L Final         12/14/2017  6:30 PM      Component Results     Component Value Ref Range & Units Status    Lipase 132 73 - 393 U/L Final         12/14/2017  5:55 PM      Component Results     Component Value Ref Range & Units Status    Lactic Acid 1.0 0.7 - 2.0 mmol/L Final         12/14/2017  7:12 PM      Component Results     Component Value Ref Range & Units Status    Color Urine Yellow  Final    Appearance  Urine Clear  Final    Glucose Urine Negative NEG^Negative mg/dL Final    Bilirubin Urine Negative NEG^Negative Final    Ketones Urine Negative NEG^Negative mg/dL Final    Specific Gravity Urine 1.016 1.003 - 1.035 Final    Blood Urine Negative NEG^Negative Final    pH Urine 5.5 5.0 - 7.0 pH Final    Protein Albumin Urine Negative NEG^Negative mg/dL Final    Urobilinogen mg/dL Normal 0.0 - 2.0 mg/dL Final    Nitrite Urine Negative NEG^Negative Final    Leukocyte Esterase Urine Negative NEG^Negative Final    Source Midstream Urine  Final         12/14/2017  7:27 PM      Component Results     Component Value Ref Range & Units Status    Amphetamine Qual Urine Negative NEG^Negative Final    Cutoff for a negative amphetamine is 500 ng/mL or less.    Barbiturates Qual Urine Negative NEG^Negative Final    Cutoff for a negative barbiturate is 200 ng/mL or less.    Benzodiazepine Qual Urine Negative NEG^Negative Final    Cutoff for a negative benzodiazepine is 200 ng/mL or less.    Cannabinoids Qual Urine Negative NEG^Negative Final    Cutoff for a negative cannabinoid is 50 ng/mL or less.    Cocaine Qual Urine Negative NEG^Negative Final    Cutoff for a negative cocaine is 300 ng/mL or less.    Opiates Qualitative Urine Negative NEG^Negative Final    Cutoff for a negative opiate is 300 ng/mL or less.    PCP Qual Urine Negative NEG^Negative Final    Cutoff for a negative PCP is 25 ng/mL or less.         12/14/2017  6:32 PM      Component Results     Component Value Ref Range & Units Status    Phosphorus 4.1 2.5 - 4.5 mg/dL Final         12/14/2017  6:30 PM      Component Results     Component Value Ref Range & Units Status    Magnesium 2.3 1.6 - 2.3 mg/dL Final         12/14/2017  7:46 PM      Narrative     CT ABDOMEN AND PELVIS WITH CONTRAST 12/14/2017 7:11 PM     HISTORY: Bowel distention/previous history of gastric bypass.    TECHNIQUE: 80 mL Isovue-370. Radiation dose for this scan was reduced  using automated exposure  "control, adjustment of the mA and/or kV  according to patient size, or iterative reconstruction technique.    COMPARISON: None.    FINDINGS: Included portions of the lung bases are unremarkable. The  liver, spleen, and pancreas appear normal. No adrenal lesions. Kidneys  are normal. No retroperitoneal adenopathy or evidence of aortic  aneurysm.    There is a large amount of stool in the colon consistent with  constipation. No evidence of diverticulitis. There is very little  mesenteric fat in this patient making evaluation of the appendix  difficult. The appendix is not identified. No obvious inflammatory  changes in the right lower quadrant.    There appears to be a surgical clip in the pelvis. There are changes  from previous gastric bypass surgery.    There is diffuse edema in the subcutaneous fat.        Impression     IMPRESSION:  1. Previous gastric bypass. There is very little mesenteric fat in the  abdomen making evaluation of the appendix and bowel wall difficult.  There does appear to be constipation with a large amount of stool in  the colon.  2. Diffuse subcutaneous edema. This may be related to hypoalbuminemia  in a malnourished patient. Recommend clinical correlation.  3. Appendix not identified, but no obvious inflammatory changes in the  right lower quadrant.                Thank you for choosing [facility]       Thank you for choosing [facility] for your care. Our goal is always to provide you with excellent care. Hearing back from our patients is one way we can continue to improve our services. Please take a few minutes to complete the written survey that you may receive in the mail after you visit with us. Thank you!        [email or web address] Information     [email or web address] lets you send messages to your doctor, view your test results, renew your prescriptions, schedule appointments and more. To sign up, go to [email or web address] . Click on \"Log in\" on the left side of the screen, which will take you to the Welcome " "page. Then click on \"Sign up Now\" on the right side of the page.     You will be asked to enter the access code listed below, as well as some personal information. Please follow the directions to create your username and password.     Your access code is: [email or web address]  Expires: 3/14/2018  8:21 PM     Your access code will  in 90 days. If you need help or a new code, please call your [facility] clinic or [phone].        Care EveryWhere ID     This is your Care EveryWhere ID. This could be used by other organizations to access your [facility] medical records  [id number]        Equal Access to Services     DAYRON WILCOX : Yenny Perez, padilla ennis, drew martinez, christopher moreland. So [facility] [phone].    ATENCIÓN: Si habla español, tiene a oneal disposición servicios gratuitos de asistencia lingüística. Llame al [phone].    We comply with applicable federal civil rights laws and [place] laws. We do not discriminate on the basis of race, color, national origin, age, disability, sex, sexual orientation, or gender identity.            After Visit Summary       This is your record. Keep this with you and show to your community pharmacist(s) and doctor(s) at your next visit.                  "

## 2025-04-05 ENCOUNTER — EMERGENCY (EMERGENCY)
Facility: HOSPITAL | Age: 25
LOS: 1 days | End: 2025-04-05
Attending: EMERGENCY MEDICINE
Payer: MEDICAID

## 2025-04-05 VITALS
TEMPERATURE: 98 F | SYSTOLIC BLOOD PRESSURE: 121 MMHG | WEIGHT: 145.06 LBS | HEIGHT: 61 IN | HEART RATE: 81 BPM | DIASTOLIC BLOOD PRESSURE: 77 MMHG | OXYGEN SATURATION: 100 % | RESPIRATION RATE: 17 BRPM

## 2025-04-05 VITALS
DIASTOLIC BLOOD PRESSURE: 66 MMHG | HEART RATE: 77 BPM | OXYGEN SATURATION: 100 % | SYSTOLIC BLOOD PRESSURE: 112 MMHG | TEMPERATURE: 98 F | RESPIRATION RATE: 16 BRPM

## 2025-04-05 LAB
ALBUMIN SERPL ELPH-MCNC: 4.2 G/DL — SIGNIFICANT CHANGE UP (ref 3.3–5)
ALP SERPL-CCNC: 43 U/L — SIGNIFICANT CHANGE UP (ref 40–120)
ALT FLD-CCNC: 17 U/L — SIGNIFICANT CHANGE UP (ref 10–45)
ANION GAP SERPL CALC-SCNC: 13 MMOL/L — SIGNIFICANT CHANGE UP (ref 5–17)
APPEARANCE UR: CLEAR — SIGNIFICANT CHANGE UP
AST SERPL-CCNC: 17 U/L — SIGNIFICANT CHANGE UP (ref 10–40)
BACTERIA # UR AUTO: ABNORMAL /HPF
BASOPHILS # BLD AUTO: 0.03 K/UL — SIGNIFICANT CHANGE UP (ref 0–0.2)
BASOPHILS NFR BLD AUTO: 0.4 % — SIGNIFICANT CHANGE UP (ref 0–2)
BILIRUB SERPL-MCNC: 0.4 MG/DL — SIGNIFICANT CHANGE UP (ref 0.2–1.2)
BILIRUB UR-MCNC: NEGATIVE — SIGNIFICANT CHANGE UP
BLD GP AB SCN SERPL QL: NEGATIVE — SIGNIFICANT CHANGE UP
BUN SERPL-MCNC: 11 MG/DL — SIGNIFICANT CHANGE UP (ref 7–23)
CALCIUM SERPL-MCNC: 8.9 MG/DL — SIGNIFICANT CHANGE UP (ref 8.4–10.5)
CAST: 0 /LPF — SIGNIFICANT CHANGE UP (ref 0–4)
CHLORIDE SERPL-SCNC: 102 MMOL/L — SIGNIFICANT CHANGE UP (ref 96–108)
CO2 SERPL-SCNC: 20 MMOL/L — LOW (ref 22–31)
COLOR SPEC: SIGNIFICANT CHANGE UP
CREAT SERPL-MCNC: 0.62 MG/DL — SIGNIFICANT CHANGE UP (ref 0.5–1.3)
DIFF PNL FLD: ABNORMAL
EGFR: 125 ML/MIN/1.73M2 — SIGNIFICANT CHANGE UP
EGFR: 125 ML/MIN/1.73M2 — SIGNIFICANT CHANGE UP
EOSINOPHIL # BLD AUTO: 0.05 K/UL — SIGNIFICANT CHANGE UP (ref 0–0.5)
EOSINOPHIL NFR BLD AUTO: 0.7 % — SIGNIFICANT CHANGE UP (ref 0–6)
GLUCOSE SERPL-MCNC: 112 MG/DL — HIGH (ref 70–99)
GLUCOSE UR QL: NEGATIVE MG/DL — SIGNIFICANT CHANGE UP
HCG SERPL-ACNC: 807.4 MIU/ML — HIGH
HCT VFR BLD CALC: 33.9 % — LOW (ref 34.5–45)
HCV AB S/CO SERPL IA: 0.05 S/CO — SIGNIFICANT CHANGE UP
HCV AB SERPL-IMP: SIGNIFICANT CHANGE UP
HGB BLD-MCNC: 11.5 G/DL — SIGNIFICANT CHANGE UP (ref 11.5–15.5)
HIV 1 & 2 AB SERPL IA.RAPID: SIGNIFICANT CHANGE UP
IMM GRANULOCYTES NFR BLD AUTO: 0.8 % — SIGNIFICANT CHANGE UP (ref 0–0.9)
KETONES UR-MCNC: ABNORMAL MG/DL
LEUKOCYTE ESTERASE UR-ACNC: NEGATIVE — SIGNIFICANT CHANGE UP
LYMPHOCYTES # BLD AUTO: 2.48 K/UL — SIGNIFICANT CHANGE UP (ref 1–3.3)
LYMPHOCYTES # BLD AUTO: 34.5 % — SIGNIFICANT CHANGE UP (ref 13–44)
MCHC RBC-ENTMCNC: 31.1 PG — SIGNIFICANT CHANGE UP (ref 27–34)
MCHC RBC-ENTMCNC: 33.9 G/DL — SIGNIFICANT CHANGE UP (ref 32–36)
MCV RBC AUTO: 91.6 FL — SIGNIFICANT CHANGE UP (ref 80–100)
MONOCYTES # BLD AUTO: 0.38 K/UL — SIGNIFICANT CHANGE UP (ref 0–0.9)
MONOCYTES NFR BLD AUTO: 5.3 % — SIGNIFICANT CHANGE UP (ref 2–14)
NEUTROPHILS # BLD AUTO: 4.18 K/UL — SIGNIFICANT CHANGE UP (ref 1.8–7.4)
NEUTROPHILS NFR BLD AUTO: 58.3 % — SIGNIFICANT CHANGE UP (ref 43–77)
NITRITE UR-MCNC: NEGATIVE — SIGNIFICANT CHANGE UP
NRBC BLD AUTO-RTO: 0 /100 WBCS — SIGNIFICANT CHANGE UP (ref 0–0)
PH UR: 5.5 — SIGNIFICANT CHANGE UP (ref 5–8)
PLATELET # BLD AUTO: 212 K/UL — SIGNIFICANT CHANGE UP (ref 150–400)
POTASSIUM SERPL-MCNC: 3.3 MMOL/L — LOW (ref 3.5–5.3)
POTASSIUM SERPL-SCNC: 3.3 MMOL/L — LOW (ref 3.5–5.3)
PROT SERPL-MCNC: 6.6 G/DL — SIGNIFICANT CHANGE UP (ref 6–8.3)
PROT UR-MCNC: NEGATIVE MG/DL — SIGNIFICANT CHANGE UP
RBC # BLD: 3.7 M/UL — LOW (ref 3.8–5.2)
RBC # FLD: 11.3 % — SIGNIFICANT CHANGE UP (ref 10.3–14.5)
RBC CASTS # UR COMP ASSIST: 1 /HPF — SIGNIFICANT CHANGE UP (ref 0–4)
REVIEW: SIGNIFICANT CHANGE UP
RH IG SCN BLD-IMP: POSITIVE — SIGNIFICANT CHANGE UP
SODIUM SERPL-SCNC: 135 MMOL/L — SIGNIFICANT CHANGE UP (ref 135–145)
SP GR SPEC: 1.03 — SIGNIFICANT CHANGE UP (ref 1–1.03)
SQUAMOUS # UR AUTO: 2 /HPF — SIGNIFICANT CHANGE UP (ref 0–5)
UROBILINOGEN FLD QL: 0.2 MG/DL — SIGNIFICANT CHANGE UP (ref 0.2–1)
WBC # BLD: 7.18 K/UL — SIGNIFICANT CHANGE UP (ref 3.8–10.5)
WBC # FLD AUTO: 7.18 K/UL — SIGNIFICANT CHANGE UP (ref 3.8–10.5)
WBC UR QL: 0 /HPF — SIGNIFICANT CHANGE UP (ref 0–5)

## 2025-04-05 PROCEDURE — 81001 URINALYSIS AUTO W/SCOPE: CPT

## 2025-04-05 PROCEDURE — 80053 COMPREHEN METABOLIC PANEL: CPT

## 2025-04-05 PROCEDURE — 93975 VASCULAR STUDY: CPT

## 2025-04-05 PROCEDURE — 99284 EMERGENCY DEPT VISIT MOD MDM: CPT | Mod: GC

## 2025-04-05 PROCEDURE — 76817 TRANSVAGINAL US OBSTETRIC: CPT

## 2025-04-05 PROCEDURE — 76817 TRANSVAGINAL US OBSTETRIC: CPT | Mod: 26

## 2025-04-05 PROCEDURE — 84702 CHORIONIC GONADOTROPIN TEST: CPT

## 2025-04-05 PROCEDURE — 86803 HEPATITIS C AB TEST: CPT

## 2025-04-05 PROCEDURE — 86850 RBC ANTIBODY SCREEN: CPT

## 2025-04-05 PROCEDURE — 93975 VASCULAR STUDY: CPT | Mod: 26

## 2025-04-05 PROCEDURE — 86900 BLOOD TYPING SEROLOGIC ABO: CPT

## 2025-04-05 PROCEDURE — 86703 HIV-1/HIV-2 1 RESULT ANTBDY: CPT

## 2025-04-05 PROCEDURE — 99285 EMERGENCY DEPT VISIT HI MDM: CPT

## 2025-04-05 PROCEDURE — 86901 BLOOD TYPING SEROLOGIC RH(D): CPT

## 2025-04-05 PROCEDURE — 99284 EMERGENCY DEPT VISIT MOD MDM: CPT | Mod: 25

## 2025-04-05 PROCEDURE — 85025 COMPLETE CBC W/AUTO DIFF WBC: CPT

## 2025-04-05 RX ADMIN — Medication 40 MILLIEQUIVALENT(S): at 11:28

## 2025-04-05 NOTE — ED PROVIDER NOTE - PATIENT PORTAL LINK FT
You can access the FollowMyHealth Patient Portal offered by [facility] by registering at the following website: [email or web address]. By joining [employer]’s FollowMyHealth portal, you will also be able to view your health information using other applications (apps) compatible with our system.

## 2025-04-05 NOTE — ED ADULT NURSE NOTE - CHIEF COMPLAINT
----- Message from Ivette Bustos sent at 11/24/2017 12:35 PM CST -----  Contact: Patient   Patient wants to know if her medical records was received in the office, Please call her at [phone].    Thanks  td   The patient is a 25y Female complaining of

## 2025-04-05 NOTE — ED ADULT TRIAGE NOTE - CCCP TRG CHIEF CMPLNT
HCG series Clindamycin Pregnancy And Lactation Text: This medication can be used in pregnancy if certain situations. Clindamycin is also present in breast milk.

## 2025-04-05 NOTE — ED PROVIDER NOTE - NSFOLLOWUPINSTRUCTIONS_ED_ALL_ED_FT
Today you were seen in the ED for evaluation of your pregnancy hormone level.     A copy of your results are attached in this document below.     Please follow up within 48 hours for repeat blood work and imaging. If you are unable to get an appointment with your OBGYN office, please come back to the emergency department.     SEEK IMMEDIATE MEDICAL CARE IF YOU HAVE ANY OF THE FOLLOWING SYMPTOMS: severe chest pain, difficulty breathing, fainting, fevers that persist despite taking medications over the counter, vomiting blood, dark or bloody stools, inability to tolerate eating and drinking food by mouth

## 2025-04-05 NOTE — ED PROVIDER NOTE - OBJECTIVE STATEMENT
25-year-old female G1, P0, 5 weeks pregnant, LMP 3/3/2025 presents ED for follow up hCG check. Patient presented to ED yesterday for vaginal bleeding in setting of positive home pregnancy test, with passage of bright red clots. ED workup yesterday with hcG of 2077, and TVUS with pregnancy of unknown location. Patient called by OBGYN office to present to ED today for repeat beta. Denies shortness of breath, dizziness, dysuria, back pain, abdominal pain.

## 2025-04-05 NOTE — ED ADULT NURSE NOTE - OBJECTIVE STATEMENT
24yo F presents to ED for hcg follow up. Pt reports LMP March 4, had positive pregnancy test at home. Pt reports having vaginal bleeding with blood clots on Thursday that stopped yesterday evening, now endorsing "spotting while wiping". Pt was seen here yesterday and advised to come back today for follow up hcg and US. Pt denies abdominal pain/cramping, N&V, urinary symptoms, fevers, SOB, dizziness, or lightheadedness. Pt A&Ox3, breathing spontaneous and unlabored, patten and following commands, skin warm and appropriate color for pt. Bed in lowest position, call bell within reach, comfort measures provided.

## 2025-04-05 NOTE — CONSULT NOTE ADULT - SUBJECTIVE AND OBJECTIVE BOX
***INCOMPLETE NOTE***  GABBY BORJA  25y  Female [id number]    HPI:  25y  @4w4d by LMP 3/4 presenting to the ED for follow up bHCG. Pt was initially seen by the OB on Monday when she was found to be pregnant. She then had vaginal bleeding on 4/3 which prompted her to present to the ED. She was found to have a PUL and was told to follow up for bHCG trending. She denies vaginal bleeding, lightheadedness, dizziness, chest pain, dyspnea, N/V, abdominal pain, dysuria, and constipation/diarrhea. This is an unplanned but desired pregnancy./    bHC (3/31)->2077 (4/3)->807.4 ()      Name of GYN Physician: Dr. Judith Monterroso  OBHx: Nulliparous  GynHx: Denies fibroids, cysts, endometriosis, STI's, Abnormal pap smears   PMH: Denies  PSH: Denies  Meds: Denies  Allx: NKDA  Social History:  Denies smoking use, drug use, alcohol use.    Vital Signs Last 24 Hrs  T(C): 36.9 (2025 07:48), Max: 36.9 (2025 07:48)  T(F): 98.4 (2025 07:48), Max: 98.4 (2025 07:48)  HR: 71 (2025 07:48) (71 - 81)  BP: 118/56 (2025 07:48) (118/56 - 121/77)  BP(mean): 79 (2025 07:48) (79 - 79)  RR: 17 (2025 07:48) (17 - 17)  SpO2: 99% (2025 07:48) (99% - 100%)    Parameters below as of 2025 07:48  Patient On (Oxygen Delivery Method): room air        Physical Exam:   Chaperone:   General: sitting comfortably in bed, NAD   HEENT: neck supple, full ROM  CV: Well perfused on exam  Lungs: Saturating well on RA  Back: No CVA tenderness  Abd: Soft, non-tender, non-distended.  Bowel sounds present.    :  No bleeding on pad.    External labia wnl.  Bimanual exam with no cervical motion tenderness, uterus wnl, adnexa non palpable b/l.  Cervix closed vs. Cervix dilated  Speculum Exam: No active bleeding from os.  Physiologic discharge.    Ext: non-tender b/l, no edema     LABS:                              11.5   7.18  )-----------( 212      ( 2025 07:49 )             33.9         135  |  102  |  11  ----------------------------<  112[H]  3.3[L]   |  20[L]  |  0.62    Ca    8.9      2025 07:49    TPro  6.6  /  Alb  4.2  /  TBili  0.4  /  DBili  x   /  AST  17  /  ALT  17  /  AlkPhos  43      I&O's Detail    PT/INR - ( 2025 00:22 )   PT: 10.5 sec;   INR: 0.91 ratio         PTT - ( 2025 00:22 )  PTT:30.1 sec  Urinalysis Basic - ( 2025 08:31 )    Color: Dark Yellow / Appearance: Clear / S.030 / pH: x  Gluc: x / Ketone: Trace mg/dL  / Bili: Negative / Urobili: 0.2 mg/dL   Blood: x / Protein: Negative mg/dL / Nitrite: Negative   Leuk Esterase: Negative / RBC: 1 /HPF / WBC 0 /HPF   Sq Epi: x / Non Sq Epi: 2 /HPF / Bacteria: Few /HPF        RADIOLOGY & ADDITIONAL STUDIES:    < from: US Transvaginal, OB (25 @ 09:21) >    ACC: [id number] EXAM:  US DPLX PELVIC   ORDERED BY:  SADAF WEINBERG     ACC: [id number] EXAM:  US OB TRANSVAGINAL   ORDERED BY:  SADAF WEINBERG     PROCEDURE DATE:  2025          INTERPRETATION:  CLINICAL INFORMATION: Pregnant with spotting. Beta-hCG   is 807 (2025) and the prior was     LMP: 2025    COMPARISON: Pelvic ultrasound 2025.    TECHNIQUE:  Endovaginal and transabdominal pelvic sonogram. Color and Spectral   Doppler was performed.    FINDINGS:  Uterus: 7.4 cm x3.8 cm x 5.3 cm. Within normal limits.  Endometrium: 16 mm. thickened and heterogenous. Hyperechoic portions of   the endometrium consistent with blood. No intrauterine pregnancy   visualized.    Right ovary: 3.3 cm x 2.9 cm x 3.0 cm. Corpus luteal cyst measuring 2.0 x   1.9 x 1.9 cm, with internal hypoechoic layering consistent with   hemorrhagic components. Additional paraovarian cysts largest measuring   1.4 x 0.9 x 1.0 cm.  Left ovary: 2.3 cm x 1.3 cm x 1.2 cm. Within normal limits.    Fluid:Trace free fluid.    IMPRESSION:  Pregnancy of unknown location. Findings may reflect spontaneous   miscarriage, or sonographically occult ectopic pregnancy. Follow-up   serial quantitative serum beta-hCG and repeat ultrasound as clinically   indicated.        --- End of Report ---    < end of copied text >   GABBY BORJA  25y  Female [id number]    HPI:  25y  @4w4d by LMP 3/4 presenting to the ED for follow up bHCG. Pt was initially seen by the OB on Monday when she was found to be pregnant. She then had vaginal bleeding on 4/3 which prompted her to present to the ED. She was found to have a PUL and was told to follow up for bHCG trending. She endorses decreased vaginal bleeding requiring one pad that was not completely saturated. She denies lightheadedness, dizziness, chest pain, dyspnea, N/V, abdominal pain, dysuria, and constipation/diarrhea. This is an unplanned but desired pregnancy./    bHC (3/31)->2077 (4/3)->807.4 ()      Name of GYN Physician: Dr. Judith Monterroso  OBHx: Nulliparous  GynHx: Denies fibroids, cysts, endometriosis, STI's, Abnormal pap smears   PMH: Denies  PSH: Denies  Meds: Denies  Allx: NKDA  Social History:  Denies smoking use, drug use, alcohol use.    Vital Signs Last 24 Hrs  T(C): 36.9 (2025 07:48), Max: 36.9 (2025 07:48)  T(F): 98.4 (2025 07:48), Max: 98.4 (2025 07:48)  HR: 71 (2025 07:48) (71 - 81)  BP: 118/56 (2025 07:48) (118/56 - 121/77)  BP(mean): 79 (2025 07:48) (79 - 79)  RR: 17 (2025 07:48) (17 - 17)  SpO2: 99% (2025 07:48) (99% - 100%)    Parameters below as of 2025 07:48  Patient On (Oxygen Delivery Method): room air        Physical Exam:   General: sitting comfortably in bed, NAD   HEENT: neck supple, full ROM  CV: Well perfused on exam  Lungs: Saturating well on RA  Abd: Soft, non-tender, non-distended.  :  No bleeding on pad.    External labia wnl.   Ext: non-tender b/l, no edema     LABS:                              11.5   7.18  )-----------( 212      ( 2025 07:49 )             33.9         135  |  102  |  11  ----------------------------<  112[H]  3.3[L]   |  20[L]  |  0.62    Ca    8.9      2025 07:49    TPro  6.6  /  Alb  4.2  /  TBili  0.4  /  DBili  x   /  AST  17  /  ALT  17  /  AlkPhos  43      I&O's Detail    PT/INR - ( 2025 00:22 )   PT: 10.5 sec;   INR: 0.91 ratio         PTT - ( 2025 00:22 )  PTT:30.1 sec  Urinalysis Basic - ( 2025 08:31 )    Color: Dark Yellow / Appearance: Clear / S.030 / pH: x  Gluc: x / Ketone: Trace mg/dL  / Bili: Negative / Urobili: 0.2 mg/dL   Blood: x / Protein: Negative mg/dL / Nitrite: Negative   Leuk Esterase: Negative / RBC: 1 /HPF / WBC 0 /HPF   Sq Epi: x / Non Sq Epi: 2 /HPF / Bacteria: Few /HPF        RADIOLOGY & ADDITIONAL STUDIES:    < from: US Transvaginal, OB (25 @ 09:21) >    ACC: [id number] EXAM:  US DPLX PELVIC   ORDERED BY:  SADAF WEINBERG     ACC: [id number] EXAM:  US OB TRANSVAGINAL   ORDERED BY:  SADAF WEINBERG     PROCEDURE DATE:  2025          INTERPRETATION:  CLINICAL INFORMATION: Pregnant with spotting. Beta-hCG   is 807 (2025) and the prior was     LMP: 2025    COMPARISON: Pelvic ultrasound 2025.    TECHNIQUE:  Endovaginal and transabdominal pelvic sonogram. Color and Spectral   Doppler was performed.    FINDINGS:  Uterus: 7.4 cm x3.8 cm x 5.3 cm. Within normal limits.  Endometrium: 16 mm. thickened and heterogenous. Hyperechoic portions of   the endometrium consistent with blood. No intrauterine pregnancy   visualized.    Right ovary: 3.3 cm x 2.9 cm x 3.0 cm. Corpus luteal cyst measuring 2.0 x   1.9 x 1.9 cm, with internal hypoechoic layering consistent with   hemorrhagic components. Additional paraovarian cysts largest measuring   1.4 x 0.9 x 1.0 cm.  Left ovary: 2.3 cm x 1.3 cm x 1.2 cm. Within normal limits.    Fluid:Trace free fluid.    IMPRESSION:  Pregnancy of unknown location. Findings may reflect spontaneous   miscarriage, or sonographically occult ectopic pregnancy. Follow-up   serial quantitative serum beta-hCG and repeat ultrasound as clinically   indicated.        --- End of Report ---    < end of copied text >   GABBY BORJA  25y  Female [id number]    HPI:  25y  @4w4d by LMP 3/4 presenting to the ED for follow up bHCG. Pt was initially seen by the OB on Monday when she was found to be pregnant. She then had vaginal bleeding on 4/3 which prompted her to present to the ED. She was found to have a PUL and was told to follow up for bHCG trending. She endorses decreased vaginal bleeding requiring one pad that was not completely saturated. She denies lightheadedness, dizziness, chest pain, dyspnea, N/V, abdominal pain, dysuria, and constipation/diarrhea. This is an unplanned but desired pregnancy.    bHC (3/31)->2077 (4/3)->807.4 ()      Name of GYN Physician: Dr. Judith Monterroso  OBHx: Nulliparous  GynHx: Denies fibroids, cysts, endometriosis, STI's, Abnormal pap smears   PMH: Denies  PSH: Denies  Meds: Denies  Allx: NKDA  Social History:  Denies smoking use, drug use, alcohol use.    Vital Signs Last 24 Hrs  T(C): 36.9 (2025 07:48), Max: 36.9 (2025 07:48)  T(F): 98.4 (2025 07:48), Max: 98.4 (2025 07:48)  HR: 71 (2025 07:48) (71 - 81)  BP: 118/56 (2025 07:48) (118/56 - 121/77)  BP(mean): 79 (2025 07:48) (79 - 79)  RR: 17 (2025 07:48) (17 - 17)  SpO2: 99% (2025 07:48) (99% - 100%)    Parameters below as of 2025 07:48  Patient On (Oxygen Delivery Method): room air        Physical Exam:   General: sitting comfortably in bed, NAD   HEENT: neck supple, full ROM  CV: Well perfused on exam  Lungs: Saturating well on RA  Abd: Soft, non-tender, non-distended.  :  No bleeding on pad.    External labia wnl.   Ext: non-tender b/l, no edema     LABS:                              11.5   7.18  )-----------( 212      ( 2025 07:49 )             33.9         135  |  102  |  11  ----------------------------<  112[H]  3.3[L]   |  20[L]  |  0.62    Ca    8.9      2025 07:49    TPro  6.6  /  Alb  4.2  /  TBili  0.4  /  DBili  x   /  AST  17  /  ALT  17  /  AlkPhos  43      I&O's Detail    PT/INR - ( 2025 00:22 )   PT: 10.5 sec;   INR: 0.91 ratio         PTT - ( 2025 00:22 )  PTT:30.1 sec  Urinalysis Basic - ( 2025 08:31 )    Color: Dark Yellow / Appearance: Clear / S.030 / pH: x  Gluc: x / Ketone: Trace mg/dL  / Bili: Negative / Urobili: 0.2 mg/dL   Blood: x / Protein: Negative mg/dL / Nitrite: Negative   Leuk Esterase: Negative / RBC: 1 /HPF / WBC 0 /HPF   Sq Epi: x / Non Sq Epi: 2 /HPF / Bacteria: Few /HPF        RADIOLOGY & ADDITIONAL STUDIES:    < from: US Transvaginal, OB (25 @ 09:21) >    ACC: [id number] EXAM:  US DPLX PELVIC   ORDERED BY:  SADAF WEINBERG     ACC: [id number] EXAM:  US OB TRANSVAGINAL   ORDERED BY:  SADAF WEINBERG     PROCEDURE DATE:  2025          INTERPRETATION:  CLINICAL INFORMATION: Pregnant with spotting. Beta-hCG   is 807 (2025) and the prior was     LMP: 2025    COMPARISON: Pelvic ultrasound 2025.    TECHNIQUE:  Endovaginal and transabdominal pelvic sonogram. Color and Spectral   Doppler was performed.    FINDINGS:  Uterus: 7.4 cm x3.8 cm x 5.3 cm. Within normal limits.  Endometrium: 16 mm. thickened and heterogenous. Hyperechoic portions of   the endometrium consistent with blood. No intrauterine pregnancy   visualized.    Right ovary: 3.3 cm x 2.9 cm x 3.0 cm. Corpus luteal cyst measuring 2.0 x   1.9 x 1.9 cm, with internal hypoechoic layering consistent with   hemorrhagic components. Additional paraovarian cysts largest measuring   1.4 x 0.9 x 1.0 cm.  Left ovary: 2.3 cm x 1.3 cm x 1.2 cm. Within normal limits.    Fluid:Trace free fluid.    IMPRESSION:  Pregnancy of unknown location. Findings may reflect spontaneous   miscarriage, or sonographically occult ectopic pregnancy. Follow-up   serial quantitative serum beta-hCG and repeat ultrasound as clinically   indicated.        --- End of Report ---    < end of copied text >

## 2025-04-05 NOTE — CONSULT NOTE ADULT - ASSESSMENT
***INCOMPLETE NOTE***  A/P: 25y   at 4w5d by LMP 3/4 presenting with vaginal bleeding (and uterine cramping), found to have bHCG of 807.4. Transvaginal ultrasound does not identify any intra- or extrauterine gestation at this time. Differential includes ectopic pregnancy vs. early viable IUP vs. spontaneous  in progress. Patient will need close monitoring and repeat bloodwork to assess.    Recommendations  - Patient to follow up in 48 hours for repeat bHCG  - Rh type: B+. No need for rhogam at this time.   - Ectopic precautions reviewed with patient, including severe abdominal/pelvic pain and dizziness/lightheadedness. Discussed with patient importance of follow up for bHCG given unknown pregnancy location at this time. Patient expressed understanding. All questions and concerns addressed to patient's apparent satisfaction.   - Patient to be added to [facility] GYN bHCG list for closer follow up.    - Patient stable for d/c home from GYN perspective.  Primary management per ED team.      d/w attending . **  Sirisha Lopez PGY2   A/P: 25y   at 4w5d by LMP 3/4 presenting with vaginal bleeding (and uterine cramping), found to have bHCG of 807.4. Transvaginal ultrasound does not identify any intra- or extrauterine gestation at this time. Differential includes ectopic pregnancy vs. early viable IUP vs. spontaneous  in progress. Patient will need close monitoring and repeat bloodwork to assess.    Recommendations  - Patient to follow up in 48 hours for repeat bHCG  - Rh type: B+. No need for rhogam at this time.   - Ectopic precautions reviewed with patient, including severe abdominal/pelvic pain and dizziness/lightheadedness. Discussed with patient importance of follow up for bHCG given unknown pregnancy location at this time. Patient expressed understanding. All questions and concerns addressed to patient's apparent satisfaction.   - Patient to be added to [facility] GYN bHCG list for closer follow up.    - Patient stable for d/c home from GYN perspective.  Primary management per ED team.      d/w attending Dr. Wenceslao Lopez PGY2   A/P: 25y  at 4w4d by LMP 3/4 presenting for follow-up of PUL diagnosed during workup of bleeding/cramping. Previous bHCG  on 4/3. bHCG today of 807.4. Patient stable with normal VS, minimal bleeding today, no additional complaints. Transvaginal ultrasound does not identify any intra- or extrauterine gestation at this time. Beta hcg trend c/w likely spontaneous . Differential includes ectopic pregnancy vs. resolving spontaneous . Patient will need close monitoring and repeat bloodwork to assess.    Recommendations  - Patient to follow up in 48 hours for repeat bHCG (will trend to zero to definitively exclude ectopic pregnancy)  - Rh type: B+. No need for rhogam at this time.   - Ectopic precautions reviewed with patient, including severe abdominal/pelvic pain and dizziness/lightheadedness. Discussed with patient importance of follow up for bHCG given unknown pregnancy location at this time. Patient expressed understanding. All questions and concerns addressed to patient's apparent satisfaction.   - Patient already on [facility] GYN bHCG list for close follow up.  - Patient stable for d/c home from GYN perspective.  Primary management per ED team.      d/w attending Dr. Wenceslao Lopez PGY2

## 2025-04-05 NOTE — ED PROVIDER NOTE - CLINICAL SUMMARY MEDICAL DECISION MAKING FREE TEXT BOX
25-year-old female G1, P0, 5 weeks pregnant, LMP 3/3/2025 presents ED for follow up hCG check. Patient presented to ED yesterday for vaginal bleeding in setting of positive home pregnancy test, with passage of bright red clots. Patient is hemodynamically stable, with improvement in bleeding. Will check hcg, speak with OBGYN team regarding recs for rpt ultrasound and likely d/c with beta list followup. 25-year-old female G1, P0, 5 weeks pregnant, LMP 3/3/2025 presents ED for follow up hCG check. Patient presented to ED yesterday for vaginal bleeding in setting of positive home pregnancy test, with passage of bright red clots. Patient is hemodynamically stable, with improvement in bleeding. Will check hcg, speak with OBGYN team regarding recs for rpt ultrasound and likely d/c with beta list followup.ZR

## 2025-04-05 NOTE — CONSULT NOTE ADULT - ATTENDING COMMENTS
Patient discussed with resident. Agree with above documentation with my edits made. Briefly, this is a 25y  at 4w4d by LMP 3/4 presenting for follow-up of PUL. bHCG trend is c/w likely spontaneous  ( > 807.4). Patient previously had heavier bleeding but now only has spotting. No abdominal pain or other complaints. Will continue to trend bHCG to zero to exclude ectopic pregnancy. Patient understands ectopic precautions and when to return to ED.    Izzy Billy MD  OB/GYN

## 2025-04-05 NOTE — ED PROVIDER NOTE - PROGRESS NOTE DETAILS
Kassy PGY-2: spoke with GYN team who is recommending rpt TVUS today as well. Kassy PGY-2: beta hcg decreased from 24 hours ago. US without evidence of IUP or ectopic. per OBGYN patient ok to be discharged with f/u in 48 hours. relayed this to patient, understands plan. return precautions discussed.

## 2025-04-08 NOTE — CHART NOTE - NSCHARTNOTEFT_GEN_A_CORE
Called patient and left a voicemail reminding her to come to the emergency department for repeat bHCG today. Discussed in voicemail importance of seeking immediate medical attention if she develops severe pain, vaginal bleeding, lightheadedness, dizziness or any other concerning symptoms.    Bety Muro PGY1
Called patient to follow up whether she was able to get bHCG testing yesterday. She saw her OBGYN, Dr. Monterroso and had repeat blood work done in the office yesterday 4/7 and read me the results over the phone that her bHCG was 384. She reports that she also had a TVUS. She reports having another schedule appointment with Dr. Monterroso on 4/14. Her vaginal bleeding has been improving and she has had no bleeding today. Denies abdominal pain, dizziness, lightheadedness. Discussed with patient that she should continue to follow up with Dr. Monterroso as scheduled and if she has any concerning symptoms to present to the ED.    Bety Muro PGY1  D/w Gyn Team